# Patient Record
Sex: MALE | Race: WHITE | HISPANIC OR LATINO | Employment: FULL TIME | URBAN - METROPOLITAN AREA
[De-identification: names, ages, dates, MRNs, and addresses within clinical notes are randomized per-mention and may not be internally consistent; named-entity substitution may affect disease eponyms.]

---

## 2023-09-11 ENCOUNTER — APPOINTMENT (OUTPATIENT)
Dept: INTERPRETER SERVICES | Facility: CLINIC | Age: 18
End: 2023-09-11

## 2023-09-11 ENCOUNTER — HOSPITAL ENCOUNTER (EMERGENCY)
Facility: CLINIC | Age: 18
Discharge: HOME | End: 2023-09-11
Attending: PHYSICIAN ASSISTANT | Admitting: PHYSICIAN ASSISTANT

## 2023-09-11 VITALS
OXYGEN SATURATION: 99 % | DIASTOLIC BLOOD PRESSURE: 83 MMHG | HEART RATE: 100 BPM | SYSTOLIC BLOOD PRESSURE: 140 MMHG | TEMPERATURE: 98.8 F | RESPIRATION RATE: 18 BRPM

## 2023-09-11 DIAGNOSIS — S61.412A LACERATION OF LEFT HAND WITHOUT FOREIGN BODY, INITIAL ENCOUNTER: ICD-10-CM

## 2023-09-11 PROCEDURE — 12002 RPR S/N/AX/GEN/TRNK2.6-7.5CM: CPT

## 2023-09-11 PROCEDURE — 99283 EMERGENCY DEPT VISIT LOW MDM: CPT | Mod: 25

## 2023-09-11 PROCEDURE — 90715 TDAP VACCINE 7 YRS/> IM: CPT | Performed by: PHYSICIAN ASSISTANT

## 2023-09-11 PROCEDURE — 90471 IMMUNIZATION ADMIN: CPT | Performed by: PHYSICIAN ASSISTANT

## 2023-09-11 PROCEDURE — 250N000011 HC RX IP 250 OP 636: Performed by: PHYSICIAN ASSISTANT

## 2023-09-11 RX ORDER — BUPIVACAINE HYDROCHLORIDE 5 MG/ML
10 INJECTION, SOLUTION EPIDURAL; INTRACAUDAL ONCE
Status: DISCONTINUED | OUTPATIENT
Start: 2023-09-11 | End: 2023-09-11 | Stop reason: HOSPADM

## 2023-09-11 RX ADMIN — CLOSTRIDIUM TETANI TOXOID ANTIGEN (FORMALDEHYDE INACTIVATED), CORYNEBACTERIUM DIPHTHERIAE TOXOID ANTIGEN (FORMALDEHYDE INACTIVATED), BORDETELLA PERTUSSIS TOXOID ANTIGEN (GLUTARALDEHYDE INACTIVATED), BORDETELLA PERTUSSIS FILAMENTOUS HEMAGGLUTININ ANTIGEN (FORMALDEHYDE INACTIVATED), BORDETELLA PERTUSSIS PERTACTIN ANTIGEN, AND BORDETELLA PERTUSSIS FIMBRIAE 2/3 ANTIGEN 0.5 ML: 5; 2; 2.5; 5; 3; 5 INJECTION, SUSPENSION INTRAMUSCULAR at 17:28

## 2023-09-11 ASSESSMENT — ACTIVITIES OF DAILY LIVING (ADL): ADLS_ACUITY_SCORE: 35

## 2023-09-11 NOTE — ED PROVIDER NOTES
History     Chief Complaint:  Hand Injury     The history is provided by the patient. A  was used.      Ketan Otto is a 18 year old male presenting to the emergency department for evaluation of a hand injury. Ketan explains that he cut his left hand with a knife while cutting carpet at work this afternoon. He has a laceration to the left thenar eminence with mild bleeding. He reports no numbness, tingling or weakness in his thumb of hand. He is unsure of his last tetanus vaccination. No concern for retained foreign body.    Independent Historian:   None - Patient Only    Review of External Notes:       Medications:    The patient has no daily or prescription medications.    Past Medical History:    The patient has no pertinent past medical history.    Physical Exam   Patient Vitals for the past 24 hrs:   BP Temp Temp src Pulse Resp SpO2   09/11/23 1545 (!) 140/83 98.8  F (37.1  C) Temporal 100 18 99 %      Physical Exam  Constitutional: Alert, attentive, GCS 15  HENT:    Nose: Nose normal.    Mouth/Throat: Oropharynx is clear, mucous membranes are moist   Eyes: EOM are normal.   CV: regular rate and rhythm; no murmurs, rubs or gallups  Chest: Effort normal and breath sounds normal.   GI:  There is no tenderness. No distension. Normal bowel sounds  MSK: Normal range of motion.   Neurological: Alert, attentive  Skin: Skin is warm and dry. C-shaped laceration to left thenar eminence. Mild venous bleeding. No tendon or muscular injury.    Emergency Department Course   Procedures     Laceration Repair      Procedure: Laceration Repair    Indication: Laceration    Consent: Verbal    Location: Left thumb-thenar eminence    Length: 4 cm, C shaped    Preparation: Irrigation with Sterile Saline and wound cleanser .    Anesthesia/Sedation: Bupivacaine - 0.5%      Treatment/Exploration: Wound explored, no foreign bodies found     Closure: The wound was closed with one layer.  Skin/superficial layer was closed with 6 x 4-0 Nylon using Interrupted sutures.     Patient Status: The patient tolerated the procedure well: Yes. There were no complications.    Emergency Department Course & Assessments:       Interventions:  Medications   BUPivacaine (MARCAINE) 0.5% preservative free injection (has no administration in time range)   Tdap (tetanus-diphtheria-acell pertussis) (ADACEL) injection 0.5 mL (0.5 mLs Intramuscular $Given 9/11/23 7333)     Assessments:  1650 I obtained history and examined the patient as noted above.  1658 I completed the laceration repair procedure as noted above. I discussed findings and discharge with the patient. All questions answered.     Independent Interpretation (X-rays, CTs, rhythm strip):  None    Consultations/Discussion of Management or Tests:  None        Social Determinants of Health affecting care:   None    Disposition:  The patient was discharged to home.     Impression & Plan    Medical Decision Making:  Patient is a well-appearing 17 yo F who presents with a laceration to left thenar eminence after cutting hand with carpet knife. Vitals appropriate.  Physical examination reveals C-shaped laceration to left thenar evidence with mild venous oozing.  No evidence of tendon or muscular injury.  Wound was anesthetized and thoroughly irrigated.  Laceration was closed with sutures as noted above.  Patient tolerated procedure well.  There is no evidence of foreign body on exam.  There is no evidence of muscular or tendon or bony injury as well.  I reviewed complications including infection and scarring with patient and his boss at his bedside per his request. Tetanus updated today.  Laceration cares reviewed for home and patient will require suture removal in 10 days as discussed. Return precautions to ED discussed. Patient expressed understanding of plan and was ready for discharge.    Diagnosis:    ICD-10-CM    1. Laceration of left hand without foreign body,  initial encounter  S61.412A         Scribe Disclosure:  I, Loren Lang, am serving as a scribe at 4:34 PM on 9/11/2023 to document services personally performed by Liseth Villeda PA-C based on my observations and the provider's statements to me.   9/11/2023   Liseth Villeda PA-C Steinbrueck, Emily, PA-C  09/11/23 1824

## 2023-09-11 NOTE — ED TRIAGE NOTES
Pt arrives ambulatory for laceration to left hand while working. Lacerated hand with carpet cutting knife 30 minutes prior to arrival. Reports no pain. Denies tetanus vaccination and no daily meds.

## (undated) RX ORDER — BUPIVACAINE HYDROCHLORIDE 5 MG/ML
INJECTION, SOLUTION EPIDURAL; INTRACAUDAL
Status: DISPENSED
Start: 2023-09-11